# Patient Record
Sex: FEMALE | Race: BLACK OR AFRICAN AMERICAN | NOT HISPANIC OR LATINO | URBAN - METROPOLITAN AREA
[De-identification: names, ages, dates, MRNs, and addresses within clinical notes are randomized per-mention and may not be internally consistent; named-entity substitution may affect disease eponyms.]

---

## 2021-11-15 ENCOUNTER — EMERGENCY (EMERGENCY)
Facility: HOSPITAL | Age: 71
LOS: 0 days | Discharge: HOME | End: 2021-11-15
Attending: STUDENT IN AN ORGANIZED HEALTH CARE EDUCATION/TRAINING PROGRAM
Payer: SELF-PAY

## 2021-11-15 VITALS
SYSTOLIC BLOOD PRESSURE: 155 MMHG | DIASTOLIC BLOOD PRESSURE: 78 MMHG | RESPIRATION RATE: 18 BRPM | HEART RATE: 72 BPM | TEMPERATURE: 99 F | OXYGEN SATURATION: 100 %

## 2021-11-15 VITALS — WEIGHT: 134.92 LBS

## 2021-11-15 DIAGNOSIS — R59.0 LOCALIZED ENLARGED LYMPH NODES: ICD-10-CM

## 2021-11-15 DIAGNOSIS — R09.81 NASAL CONGESTION: ICD-10-CM

## 2021-11-15 DIAGNOSIS — M54.2 CERVICALGIA: ICD-10-CM

## 2021-11-15 DIAGNOSIS — J02.9 ACUTE PHARYNGITIS, UNSPECIFIED: ICD-10-CM

## 2021-11-15 DIAGNOSIS — I10 ESSENTIAL (PRIMARY) HYPERTENSION: ICD-10-CM

## 2021-11-15 PROCEDURE — 99283 EMERGENCY DEPT VISIT LOW MDM: CPT

## 2021-11-15 RX ORDER — IBUPROFEN 200 MG
600 TABLET ORAL ONCE
Refills: 0 | Status: COMPLETED | OUTPATIENT
Start: 2021-11-15 | End: 2021-11-15

## 2021-11-15 RX ADMIN — Medication 600 MILLIGRAM(S): at 22:51

## 2021-11-15 NOTE — ED STATDOCS - NSFOLLOWUPCLINICS_GEN_ALL_ED_FT
Pending sale to [facility]  Family Medicine  [address] [place] [address]  Phone: [phone]  Fax:

## 2021-11-15 NOTE — ED PROVIDER NOTE - NSFOLLOWUPINSTRUCTIONS_ED_ALL_ED_FT
Thank you for visiting our Emergency Department, it has been a pleasure taking part in your healthcare.    Your discharge diagnosis is: lymphadenopathy  Please take all discharge medications as indicated below:  Please continue all medications as rx'd by your PMD.  Please follow up with your PMD within x48 hours.  A copy of resulted labs, imaging, and findings have been provided to you.   You have had a detailed discussion with your provider regarding your diagnosis, care management and discharge planning including, but not limited to: return precautions, follow up visits with existing or new providers, new prescriptions and/or medication changes, wound and/or splint/cast care or other care   aspects specific to your diagnosis and treatment. You have been given the opportunity to have your questions answered. At this time you have been deemed stable and fit for discharge.  Return precautions to the Emergency Department include but are not limited to: unrelenting nausea, vomiting, fever, chills, chest pain, shortness of breath, dizziness, chest or abdominal pain, worsening back pain, syncope, blood in urine or stool, headache that doesn't resolve, numbness or tingling, loss of sensation, loss of motor function, or any other concerning symptoms.

## 2021-11-15 NOTE — ED PROVIDER NOTE - CLINICAL SUMMARY MEDICAL DECISION MAKING FREE TEXT BOX
Radha PEREZ: 70F hx of HTN presents with a cc of R neck pain x3 days improved with APAP a/w runny nose nasal congestion and sore throat, worse when tries to swallow, no fever, able to range neck w/o issue. Denies numbness, tingling or loss of sensation. Denies loss of motor function. Denies n/v/f/c/cp/sob. Denies headache, syncope, lightheadedness, dizziness. Denies chest palpitations, abdominal pain. Denies edema. Denies dysuria, hematuria, BRBPR, tarry stools, diarrhea, constipation. Denies changes in vision.  exam vss non toxic PE as above ddx c/f lymphadenopathy likely 2/2 URI well appearing non toxic will give meds stable for dc w pmd follow up.

## 2021-11-15 NOTE — ED ADULT TRIAGE NOTE - CHIEF COMPLAINT QUOTE
c/o right neck pain for past 3 days, c/o hypertension, bp at home 160/85 at home yesterday, has history of HTN, c/o associated right sided headache

## 2021-11-15 NOTE — ED PROVIDER NOTE - PATIENT PORTAL LINK FT
You can access the FollowMyHealth Patient Portal offered by [facility] by registering at the following website: [email or web address]. By joining [employer]’s FollowMyHealth portal, you will also be able to view your health information using other applications (apps) compatible with our system.

## 2021-11-15 NOTE — ED STATDOCS - PATIENT PORTAL LINK FT
You can access the FollowMyHealth Patient Portal offered by [facility] by registering at the following website: [email or web address]. By joining [employer]’s FollowMyHealth portal, you will also be able to view your health information using other applications (apps) compatible with our system.

## 2021-11-15 NOTE — ED STATDOCS - NSFOLLOWUPINSTRUCTIONS_ED_ALL_ED_FT
Lymphadenopathy    AMBULATORY CARE:    Lymphadenopathy is swelling of your lymph nodes. Lymph nodes are small organs that are part of your immune system. Lymph nodes are found throughout your body. They are most easily felt in your neck, under your arms, and near your groin. Lymphadenopathy can occur in one or more areas of your body.     Common signs and symptoms include the following: You may have no symptoms, or you may have any of the following:  •A painful, warm, or red lump under your skin      •More tired than usual      •Skin rash      •Unexplained weight loss      •Enlarged spleen (organ that filters blood)      •Fever or night sweats      Seek care immediately if:   •The swollen lymph nodes bleed.      •You have swollen lymph nodes in your neck that affect your breathing or swallowing.      Contact your healthcare provider if:   •You have a fever.      •You have a new swollen and painful lymph node.      •You have a skin rash.      •Your lymph node remains swollen or painful, or it gets bigger.      •Your lymph node has red streaks around it, or the skin around the lymph node is red.      •You have questions or concerns about your condition or care.      Follow up with your doctor as directed: Write down your questions so you remember to ask them during your visits.     Self-care:   •Do not poke or squeeze the swollen lymph nodes.      •Apply heat to the swollen glands. You may use warm compresses, or an electric heating pad set on low.       •Rest as needed. If you have a fever, rest until your temperature returns to normal. Return to your normal daily activities slowly after your fever is gone.          © Copyright [employer] 2021           FOLLOW UP WITH YOUR DOCTOR OR AN ENT (EAR NOSE AND THROAT DOCTOR) THIS WEEK. CALL THE OFFICE TO MAKE AN APPOINTMENT. RETURN TO ER FOR ANY WORSENING SYMPTOMS OR NEW CONCERNS.

## 2021-11-15 NOTE — ED PROVIDER NOTE - ATTENDING CONTRIBUTION TO CARE
I have personally performed a face to face medical and diagnostic evaluation of the patient. I have discussed with and reviewed the ACP's note and agree with the History, ROS, Physical Exam and MDM unless otherwise indicated. A brief summary of my personal evaluation and impression can be found below.    Radha PEREZ: Please see the HPI, ROS, PE and MDM as authored by me.

## 2021-11-15 NOTE — ED STATDOCS - CARE PROVIDER_API CALL
Jeremias Eduardo)  Otolaryngology  [address] [place] [address]  Phone: [phone]  Fax: [phone]  Follow Up Time:

## 2021-11-15 NOTE — ED PROVIDER NOTE - PHYSICAL EXAMINATION
Radha PEREZ:  VITALS: Initial triage and subsequent vitals have been reviewed by me.  GEN APPEARANCE: WDWN, alert and cooperative, non-toxic appearing and in NAD  HEAD: Atraumatic, normocephalic   EYES: PERRLa, EOMI, vision grossly intact.   EARS: Gross hearing intact. TM Clear b/l   NOSE: No nasal discharge, no external evidence of epistaxis.   THROAT: MMM. Oral cavity and pharynx normal. No inflammation, no swelling, no exudate, no oral lesions.  NECK: Supple R sub mandibular tender lymph node   CV: RRR, S1S2, no c/r/m/g. No cyanosis or pallor. Extremities warm, well perfused. Cap refill <2 seconds. No bruits.   LUNGS: CTAB. No wheezing. No rales. No rhonchi. No diminished breath sounds.   ABDOMEN: Soft, NTND. No guarding or rebound. No masses.   MSK/EXT: Spine appears normal, no spine point tenderness. No CVA ttp. Normal muscular development. Pelvis stable. No obvious joint or bony deformity, no peripheral edema.   NEURO: Alert, follows commands. Weight bearing normal. Speech normal. Sensation and motor normal x4 extremities.   SKIN: Normal color for race, warm, dry and intact. No evidence of rash.  PSYCH: Normal mood and affect.

## 2021-11-15 NOTE — ED STATDOCS - PROGRESS NOTE DETAILS
signed Eliza Purcell PA-C Pt seen initially in intake by Dr. Garcia.   Pt with cervical lymphandenopathy. Will flu/covid swab and DC. recommend outpt f/u PMD or ENT.

## 2021-11-15 NOTE — ED PROVIDER NOTE - OBJECTIVE STATEMENT
Radha PEREZ: 70F hx of HTN presents with a cc of R neck pain x3 days improved with APAP a/w runny nose nasal congestion and sore throat, worse when tries to swallow, no fever, able to range neck w/o issue. Denies numbness, tingling or loss of sensation. Denies loss of motor function. Denies n/v/f/c/cp/sob. Denies headache, syncope, lightheadedness, dizziness. Denies chest palpitations, abdominal pain. Denies edema. Denies dysuria, hematuria, BRBPR, tarry stools, diarrhea, constipation. Denies changes in vision. vaccinated for covid x2.     Creole speaking, offered interp, declined, daughter at bedside.

## 2022-01-05 ENCOUNTER — FORM ENCOUNTER (OUTPATIENT)
Age: 72
End: 2022-01-05

## 2022-02-02 ENCOUNTER — FORM ENCOUNTER (OUTPATIENT)
Age: 72
End: 2022-02-02

## 2022-02-03 PROBLEM — Z00.00 ENCOUNTER FOR PREVENTIVE HEALTH EXAMINATION: Status: ACTIVE | Noted: 2022-02-03

## 2022-02-17 NOTE — ED ADULT NURSE NOTE - SUICIDE SCREENING DEPRESSION
I spoke with patient and informed of ultrasound results. Nodules being felt likely lobules or fat or lipomas, no further workup or treatment needed unless become bothersome to patient. She voiced understanding. Negative

## 2022-02-23 ENCOUNTER — APPOINTMENT (OUTPATIENT)
Dept: CARDIOLOGY | Facility: HOSPITAL | Age: 72
End: 2022-02-23